# Patient Record
Sex: FEMALE | Race: WHITE | NOT HISPANIC OR LATINO | Employment: FULL TIME | URBAN - METROPOLITAN AREA
[De-identification: names, ages, dates, MRNs, and addresses within clinical notes are randomized per-mention and may not be internally consistent; named-entity substitution may affect disease eponyms.]

---

## 2017-01-01 ENCOUNTER — OFFICE VISIT (OUTPATIENT)
Dept: URGENT CARE | Facility: PHYSICIAN GROUP | Age: 27
End: 2017-01-01
Payer: COMMERCIAL

## 2017-01-01 VITALS
SYSTOLIC BLOOD PRESSURE: 110 MMHG | BODY MASS INDEX: 29.26 KG/M2 | HEART RATE: 90 BPM | OXYGEN SATURATION: 98 % | RESPIRATION RATE: 16 BRPM | WEIGHT: 160 LBS | DIASTOLIC BLOOD PRESSURE: 70 MMHG | TEMPERATURE: 96.6 F

## 2017-01-01 DIAGNOSIS — R10.12 LUQ PAIN: Primary | ICD-10-CM

## 2017-01-01 DIAGNOSIS — K21.9 GASTROESOPHAGEAL REFLUX DISEASE WITHOUT ESOPHAGITIS: ICD-10-CM

## 2017-01-01 DIAGNOSIS — R19.8 FULLNESS OF ABDOMEN: ICD-10-CM

## 2017-01-01 DIAGNOSIS — R10.9 BILATERAL FLANK PAIN: ICD-10-CM

## 2017-01-01 LAB
APPEARANCE UR: NORMAL
BILIRUB UR STRIP-MCNC: NORMAL MG/DL
COLOR UR AUTO: YELLOW
GLUCOSE UR STRIP.AUTO-MCNC: NORMAL MG/DL
KETONES UR STRIP.AUTO-MCNC: 40 MG/DL
LEUKOCYTE ESTERASE UR QL STRIP.AUTO: NORMAL
NITRITE UR QL STRIP.AUTO: NORMAL
PH UR STRIP.AUTO: 5 [PH] (ref 5–8)
PROT UR QL STRIP: NORMAL MG/DL
RBC UR QL AUTO: NORMAL
SP GR UR STRIP.AUTO: 1.03
UROBILINOGEN UR STRIP-MCNC: NORMAL MG/DL

## 2017-01-01 PROCEDURE — 81002 URINALYSIS NONAUTO W/O SCOPE: CPT | Performed by: PHYSICIAN ASSISTANT

## 2017-01-01 PROCEDURE — 99214 OFFICE O/P EST MOD 30 MIN: CPT | Performed by: PHYSICIAN ASSISTANT

## 2017-01-01 RX ORDER — OMEPRAZOLE 40 MG/1
40 CAPSULE, DELAYED RELEASE ORAL DAILY
Qty: 30 CAP | Refills: 0 | Status: SHIPPED | OUTPATIENT
Start: 2017-01-01 | End: 2019-11-18

## 2017-01-01 RX ORDER — DICYCLOMINE HYDROCHLORIDE 10 MG/1
10 CAPSULE ORAL
Qty: 40 CAP | Refills: 0 | Status: SHIPPED | OUTPATIENT
Start: 2017-01-01 | End: 2017-01-11

## 2017-01-01 ASSESSMENT — ENCOUNTER SYMPTOMS: ABDOMINAL PAIN: 1

## 2017-01-01 NOTE — PROGRESS NOTES
Subjective:      Pt is a 26 y.o. female who presents with Abdominal Pain            Abdominal Pain  This is a new problem. The current episode started in the past 7 days. The onset quality is sudden. The problem occurs constantly. The problem has been gradually worsening. The pain is located in the LUQ, right flank and left flank. The pain is at a severity of 6/10. The pain is moderate. The quality of the pain is aching, dull and a sensation of fullness. The abdominal pain does not radiate. The pain is aggravated by eating and palpation. The pain is relieved by nothing. She has tried nothing for the symptoms.   Pt states for the last 5 days she has had LUQ pain and abd sensation of fullness with B/L flank pain with no NVD. Pt notes suspected hx of kidney stones but does not have those kind of symptoms she states today. PT is out of network for insurance. Pt notes increased heartburn symptoms lately. Pt has not taken any Rx medications for this condition. Pt states the pain is a 6/10, aching in nature and worse at night. Pt denies CP, SOB, NVD, paresthesias, headaches, dizziness, change in vision, hives, or other joint pain. The pt's medication list, problem list, and allergies have been evaluated and reviewed during today's visit.    PMH:  Negative per pt.      PSH:  Negative per pt.      Fam Hx:  Father with hx of HTN      Soc HX:  Social History     Social History   • Marital Status: Unknown     Spouse Name: N/A   • Number of Children: N/A   • Years of Education: N/A     Occupational History   • Not on file.     Social History Main Topics   • Smoking status: Never Smoker    • Smokeless tobacco: Not on file   • Alcohol Use: Not on file   • Drug Use: Not on file   • Sexual Activity: Not on file     Other Topics Concern   • Not on file     Social History Narrative   • No narrative on file         Medications:    Current outpatient prescriptions:   •  dicyclomine (BENTYL) 10 MG Cap, Take 1 Cap by mouth 4 Times a  Day,Before Meals and at Bedtime for 10 days., Disp: 40 Cap, Rfl: 0  •  azithromycin (ZITHROMAX) 250 MG Tab, Take 2 tablets by mouth on day one then take 1 tablet daily for day 2-5, Disp: 6 Tab, Rfl: 0  •  albuterol (VENTOLIN OR PROVENTIL) 108 (90 BASE) MCG/ACT Aero Soln inhalation aerosol, Inhale 2 Puffs by mouth every 6 hours as needed for Shortness of Breath., Disp: 8.5 g, Rfl: 3      Allergies:  Pcn        Review of Systems   Gastrointestinal: Positive for abdominal pain.   Constitutional: Negative for fever, chills and malaise/fatigue.   HENT: Negative for congestion and sore throat.    Eyes: Negative for blurred vision, double vision and photophobia.   Respiratory: Negative for cough and shortness of breath.    Cardiovascular: Negative for chest pain and palpitations.   Gastrointestinal continued: POSive for recent heartburn, LUQ abd pain and B/L flank pain and NEG for nausea, vomiting, diarrhea.   Genitourinary: Negative for dysuria and flank pain.   Musculoskeletal: Negative for joint pain and myalgias.   Skin: Negative for itching and rash.   Neurological: Negative for dizziness, tingling and headaches.   Endo/Heme/Allergies: Does not bruise/bleed easily.   Psychiatric/Behavioral: Negative for depression. The patient is not nervous/anxious.             Objective:     /70 mmHg  Pulse 90  Temp(Src) 35.9 °C (96.6 °F)  Resp 16  Wt 72.576 kg (160 lb)  SpO2 98%     Physical Exam   Abdominal: Soft. Bowel sounds are normal. She exhibits no shifting dullness, no distension, no pulsatile liver, no fluid wave, no abdominal bruit, no ascites, no pulsatile midline mass and no mass. There is no hepatosplenomegaly. There is generalized tenderness and tenderness in the left upper quadrant. There is no rigidity, no rebound, no guarding, no CVA tenderness, no tenderness at McBurney's point and negative Palm's sign. No hernia.              Constitutional: PT is oriented to person, place, and time. PT appears  well-developed and well-nourished. No distress.   HENT:   Head: Normocephalic and atraumatic.   Mouth/Throat: Oropharynx is clear and moist. No oropharyngeal exudate.   Eyes: Conjunctivae normal and EOM are normal. Pupils are equal, round, and reactive to light.   Neck: Normal range of motion. Neck supple. No thyromegaly present.   Cardiovascular: Normal rate, regular rhythm, normal heart sounds and intact distal pulses.  Exam reveals no gallop and no friction rub.    No murmur heard.  Pulmonary/Chest: Effort normal and breath sounds normal. No respiratory distress. PT has no wheezes. PT has no rales. Pt exhibits no tenderness.   Musculoskeletal: Normal range of motion. PT exhibits no edema and no tenderness.   Neurological: PT is alert and oriented to person, place, and time. PT has normal reflexes. No cranial nerve deficit.   Skin: Skin is warm and dry. No rash noted. PT is not diaphoretic. No erythema.       Psychiatric: PT has a normal mood and affect. PT behavior is normal. Judgment and thought content normal.        Assessment/Plan:     1. LUQ pain    - POCT Urinalysis  - dicyclomine (BENTYL) 10 MG Cap; Take 1 Cap by mouth 4 Times a Day,Before Meals and at Bedtime for 10 days.  Dispense: 40 Cap; Refill: 0    2. Bilateral flank pain        3. Fullness of abdomen    4. GERD    - Prilosec called into pharmacy as well    Pt is out of network for insurance and CT abd/pelvis cannot be ordered today  Pt to follow up with [facility] if no improvement for possible CT abd/pelvis  Pt to follow up with PCP as needed  Rest, fluids encouraged.  AVS with medical info given.  Pt was in full understanding and agreement with the plan.  Follow-up as needed if symptoms worsen or fail to improve.

## 2017-01-01 NOTE — MR AVS SNAPSHOT
Amanda Wen   2017 9:50 AM   Office Visit   MRN: [id number]    Department:  [facility]   Dept Phone:  [phone]    Description:  Female : 1990   Provider:  Kobe Luong PA-C           Reason for Visit     Abdominal Pain LUQ discomfort x 5 days, bloating/lump on LUQ, lower back pain, unable to eat normal      Allergies as of 2017     Allergen Noted Reactions    Pcn [Penicillins] 2016       Father allergic as a child not sure if she is       You were diagnosed with     LUQ pain   [327224]  -  Primary     Bilateral flank pain   [265166]       Fullness of abdomen   [[id number]]         Vital Signs     Blood Pressure Pulse Temperature Respirations Weight Oxygen Saturation    110/70 mmHg 90 35.9 °C (96.6 °F) 16 72.576 kg (160 lb) 98%    Smoking Status                   Never Smoker            Basic Information     Date Of Birth Sex Race Ethnicity Preferred Language    1990 Female White Non- English      Health Maintenance        Date Due Completion Dates    IMM HEP B VACCINE (1 of 3 - Primary Series) 1989 ---    IMM HEP A VACCINE (1 of 2 - Standard Series) 1990 ---    IMM VARICELLA (CHICKENPOX) VACCINE (1 of 2 - 2 Dose Adolescent Series) 2002 ---    IMM DTaP/Tdap/Td Vaccine (1 - Tdap) 2008 ---    PAP SMEAR 2010 ---    IMM INFLUENZA (1) 2016 ---            Current Immunizations     No immunizations on file.      Below and/or attached are the medications your provider expects you to take. Review all of your home medications and newly ordered medications with your provider and/or pharmacist. Follow medication instructions as directed by your provider and/or pharmacist. Please keep your medication list with you and share with your provider. Update the information when medications are discontinued, doses are changed, or new medications (including over-the-counter products) are added; and carry medication information at all times in the event of  emergency situations     Allergies:  PCN - (reactions not documented)               Medications  Valid as of: January 01, 2017 - 10:08 AM    Generic Name Brand Name Tablet Size Instructions for use    Albuterol Sulfate (Aero Soln) albuterol 108 (90 BASE) MCG/ACT Inhale 2 Puffs by mouth every 6 hours as needed for Shortness of Breath.        Azithromycin (Tab) ZITHROMAX 250 MG Take 2 tablets by mouth on day one then take 1 tablet daily for day 2-5        Dicyclomine HCl (Cap) BENTYL 10 MG Take 1 Cap by mouth 4 Times a Day,Before Meals and at Bedtime for 10 days.        .                 Medicines prescribed today were sent to:     [facility] - [place], [place] - [address]    [address] [place] [address]    Phone: [phone] Fax: [phone]    Open 24 Hours?: No      Medication refill instructions:       If your prescription bottle indicates you have medication refills left, it is not necessary to call your provider’s office. Please contact your pharmacy and they will refill your medication.    If your prescription bottle indicates you do not have any refills left, you may request refills at any time through one of the following ways: The online [employer] system (except Urgent Care), by calling your provider’s office, or by asking your pharmacy to contact your provider’s office with a refill request. Medication refills are processed only during regular business hours and may not be available until the next business day. Your provider may request additional information or to have a follow-up visit with you prior to refilling your medication.   *Please Note: Medication refills are assigned a new Rx number when refilled electronically. Your pharmacy may indicate that no refills were authorized even though a new prescription for the same medication is available at the pharmacy. Please request the medicine by name with the pharmacy before contacting your provider for a refill.        Your To Do List     Future  Labs/Procedures Complete By Expires    CT-ABDOMEN-PELVIS WITH  As directed 1/1/2018      Instructions    Flank Pain  Flank pain refers to pain that is located on the side of the body between the upper abdomen and the back. The pain may occur over a short period of time (acute) or may be long-term or reoccurring (chronic). It may be mild or severe. Flank pain can be caused by many things.  CAUSES   Some of the more common causes of flank pain include:  · Muscle strains.    · Muscle spasms.    · A disease of your spine (vertebral disk disease).    · A lung infection (pneumonia).    · Fluid around your lungs (pulmonary edema).    · A kidney infection.    · Kidney stones.    · A very painful skin rash caused by the chickenpox virus (shingles).    · Gallbladder disease.    HOME CARE INSTRUCTIONS   Home care will depend on the cause of your pain. In general,  · Rest as directed by your caregiver.  · Drink enough fluids to keep your urine clear or pale yellow.  · Only take over-the-counter or prescription medicines as directed by your caregiver. Some medicines may help relieve the pain.  · Tell your caregiver about any changes in your pain.  · Follow up with your caregiver as directed.  SEEK IMMEDIATE MEDICAL CARE IF:   · Your pain is not controlled with medicine.    · You have new or worsening symptoms.  · Your pain increases.    · You have abdominal pain.    · You have shortness of breath.    · You have persistent nausea or vomiting.    · You have swelling in your abdomen.    · You feel faint or pass out.    · You have blood in your urine.  · You have a fever or persistent symptoms for more than 2-3 days.  · You have a fever and your symptoms suddenly get worse.  MAKE SURE YOU:   · Understand these instructions.  · Will watch your condition.  · Will get help right away if you are not doing well or get worse.     This information is not intended to replace advice given to you by your health care provider. Make sure you  discuss any questions you have with your health care provider.     Document Released: 02/08/2007 Document Revised: 09/11/2013 Document Reviewed: 08/01/2013  [email or web address] Interactive Patient Education ©2016 Elsevier Inc.            Soceaniq Access Code: [email or web address]  Expires: 1/31/2017 10:08 AM    [employer]  A secure, online tool to manage your health information     [employer] Soceaniq® is a secure, online tool that connects you to your personalized health information from the privacy of your home -- day or night - making it very easy for you to manage your healthcare. Once the activation process is completed, you can even access your medical information using the Soceaniq ayo, which is available for free in the Apple Ayo store or Google Play store.     Soceaniq provides the following levels of access (as shown below):   My Chart Features   [facility]  Urgent  Care [facility]  Primary Care  Doctor   Email your healthcare team securely and privately 24/7 X X X    Manage appointments: schedule your next appointment; view details of past/upcoming appointments X      Request prescription refills. X      View recent personal medical records, including lab and immunizations X X X X   View health record, including health history, allergies, medications X X X X   Read reports about your outpatient visits, procedures, consult and ER notes X X X X   See your discharge summary, which is a recap of your hospital and/or ER visit that includes your diagnosis, lab results, and care plan. X X       How to register for Soceaniq:  1. Go to  [email or web address].  2. Click on the Sign Up Now box, which takes you to the New Member Sign Up page. You will need to provide the following information:  a. Enter your Soceaniq Access Code exactly as it appears at the top of this page. (You will not need to use this code after you’ve completed the sign-up process. If you do not sign up before the  expiration date, you must request a new code.)   b. Enter your date of birth.   c. Enter your home email address.   d. Click Submit, and follow the next screen’s instructions.  3. Create a [employer] ID. This will be your [employer] login ID and cannot be changed, so think of one that is secure and easy to remember.  4. Create a [employer] password. You can change your password at any time.  5. Enter your Password Reset Question and Answer. This can be used at a later time if you forget your password.   6. Enter your e-mail address. This allows you to receive e-mail notifications when new information is available in [employer].  7. Click Sign Up. You can now view your health information.    For assistance activating your [employer] account, call [phone]

## 2017-01-01 NOTE — PATIENT INSTRUCTIONS
Flank Pain  Flank pain refers to pain that is located on the side of the body between the upper abdomen and the back. The pain may occur over a short period of time (acute) or may be long-term or reoccurring (chronic). It may be mild or severe. Flank pain can be caused by many things.  CAUSES   Some of the more common causes of flank pain include:  · Muscle strains.    · Muscle spasms.    · A disease of your spine (vertebral disk disease).    · A lung infection (pneumonia).    · Fluid around your lungs (pulmonary edema).    · A kidney infection.    · Kidney stones.    · A very painful skin rash caused by the chickenpox virus (shingles).    · Gallbladder disease.    HOME CARE INSTRUCTIONS   Home care will depend on the cause of your pain. In general,  · Rest as directed by your caregiver.  · Drink enough fluids to keep your urine clear or pale yellow.  · Only take over-the-counter or prescription medicines as directed by your caregiver. Some medicines may help relieve the pain.  · Tell your caregiver about any changes in your pain.  · Follow up with your caregiver as directed.  SEEK IMMEDIATE MEDICAL CARE IF:   · Your pain is not controlled with medicine.    · You have new or worsening symptoms.  · Your pain increases.    · You have abdominal pain.    · You have shortness of breath.    · You have persistent nausea or vomiting.    · You have swelling in your abdomen.    · You feel faint or pass out.    · You have blood in your urine.  · You have a fever or persistent symptoms for more than 2-3 days.  · You have a fever and your symptoms suddenly get worse.  MAKE SURE YOU:   · Understand these instructions.  · Will watch your condition.  · Will get help right away if you are not doing well or get worse.     This information is not intended to replace advice given to you by your health care provider. Make sure you discuss any questions you have with your health care provider.     Document Released: 02/08/2007 Document  Revised: 09/11/2013 Document Reviewed: 08/01/2013  Elsevier Interactive Patient Education ©2016 Elsevier Inc.

## 2019-09-10 ENCOUNTER — TELEPHONE (OUTPATIENT)
Dept: URGENT CARE | Facility: PHYSICIAN GROUP | Age: 29
End: 2019-09-10

## 2019-09-10 ENCOUNTER — OFFICE VISIT (OUTPATIENT)
Dept: URGENT CARE | Facility: PHYSICIAN GROUP | Age: 29
End: 2019-09-10
Payer: COMMERCIAL

## 2019-09-10 VITALS
HEIGHT: 63 IN | SYSTOLIC BLOOD PRESSURE: 102 MMHG | HEART RATE: 75 BPM | WEIGHT: 121 LBS | DIASTOLIC BLOOD PRESSURE: 70 MMHG | OXYGEN SATURATION: 100 % | BODY MASS INDEX: 21.44 KG/M2 | TEMPERATURE: 98 F

## 2019-09-10 DIAGNOSIS — M54.12 CERVICAL RADICULOPATHY: ICD-10-CM

## 2019-09-10 DIAGNOSIS — R93.89 ABNORMAL MRI: ICD-10-CM

## 2019-09-10 DIAGNOSIS — M54.2 NECK PAIN: ICD-10-CM

## 2019-09-10 DIAGNOSIS — Z86.018 HISTORY OF BENIGN SPINAL CORD TUMOR: ICD-10-CM

## 2019-09-10 PROCEDURE — 99214 OFFICE O/P EST MOD 30 MIN: CPT | Performed by: FAMILY MEDICINE

## 2019-09-10 RX ORDER — GABAPENTIN 300 MG/1
300 CAPSULE ORAL 3 TIMES DAILY
Qty: 90 CAP | Refills: 1 | Status: SHIPPED | OUTPATIENT
Start: 2019-09-10 | End: 2019-11-18

## 2019-09-10 ASSESSMENT — ENCOUNTER SYMPTOMS: WEIGHT LOSS: 0

## 2019-09-10 ASSESSMENT — LIFESTYLE VARIABLES: SUBSTANCE_ABUSE: 0

## 2019-09-10 NOTE — PROGRESS NOTES
"Subjective:      Amanda Wen is a 29 y.o. female who presents with Stiff Neck (L shoulder pain , numbness, head pain, xwks )            1.5 months initial left neck pain that was worse with head rotation. Possibly triggered by working out. She stopped working out and now has intermittent HA and radicular pain. 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 No fever. No trauma. No PMH/FH MS or autoimmune. No other aggravating or alleviating factors.        Review of Systems   Constitutional: Negative for malaise/fatigue and weight loss.   Skin: Negative for itching and rash.   Psychiatric/Behavioral: Negative for substance abuse.   .  Medications, Allergies, and current problem list reviewed today in Epic       Objective:     /70   Pulse 75   Temp 36.7 °C (98 °F) (Temporal)   Ht 1.6 m (5' 3\")   Wt 54.9 kg (121 lb)   LMP 08/15/2019   SpO2 100%   BMI 21.43 kg/m²      Physical Exam   Constitutional: She is oriented to person, place, and time. She appears well-developed and well-nourished. No distress.   HENT:   Head: Normocephalic and atraumatic.   Eyes: Conjunctivae are normal.   Neck: Neck supple.   Left paracervical tendeness. No axial load tenderness. ROM intact. No deformity or stepoff.    Cardiovascular: Normal rate, regular rhythm and normal heart sounds.   Pulmonary/Chest: Effort normal and breath sounds normal.   Neurological: She is alert and oriented to person, place, and time. No cranial nerve deficit.   Normal gait   =  No focal deficit               Assessment/Plan:   MRI per radiology    1.  Postsurgical changes C1-T1 laminectomies.  2.  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 This could be posttreatment related however comparison with old outside prior examinations is necessary to evaluate for stability.   If prior studies are made available, comparison can BE performed and an " addendum may be placed. Further evaluation with postcontrast images would also be helpful.  3.  C3-C7 disc degeneration as detailed above with right foraminal stenosis.  4.  No significant spinal stenosis.        1. Neck pain  MR-CERVICAL SPINE-W/O   2. 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 Abnormal MRI  REFERRAL TO NEUROSURGERY     Differential diagnosis, natural history, supportive care, and indications for immediate follow-up discussed at length.

## 2019-09-10 NOTE — LETTER
September 10, 2019         Patient: Amanda Wen   YOB: 1990   Date of Visit: 9/10/2019           To Whom it May Concern:    Amanda Wen was seen in my clinic on 9/10/2019. Please excuse today.      Sincerely,           Facundo Ramirez M.D.  Electronically Signed

## 2019-09-13 ENCOUNTER — TELEPHONE (OUTPATIENT)
Dept: SCHEDULING | Facility: IMAGING CENTER | Age: 29
End: 2019-09-13

## 2019-09-20 ENCOUNTER — HOSPITAL ENCOUNTER (OUTPATIENT)
Dept: RADIOLOGY | Facility: MEDICAL CENTER | Age: 29
End: 2019-09-20
Attending: FAMILY MEDICINE
Payer: COMMERCIAL

## 2019-09-20 DIAGNOSIS — M54.12 CERVICAL RADICULOPATHY: ICD-10-CM

## 2019-09-20 DIAGNOSIS — Z86.018 HISTORY OF BENIGN SPINAL CORD TUMOR: ICD-10-CM

## 2019-09-20 DIAGNOSIS — M54.2 NECK PAIN: ICD-10-CM

## 2019-09-20 PROCEDURE — 72141 MRI NECK SPINE W/O DYE: CPT

## 2019-10-18 ENCOUNTER — OFFICE VISIT (OUTPATIENT)
Dept: MEDICAL GROUP | Facility: PHYSICIAN GROUP | Age: 29
End: 2019-10-18
Payer: COMMERCIAL

## 2019-10-18 VITALS
RESPIRATION RATE: 16 BRPM | SYSTOLIC BLOOD PRESSURE: 110 MMHG | OXYGEN SATURATION: 97 % | WEIGHT: 122.8 LBS | BODY MASS INDEX: 21.76 KG/M2 | DIASTOLIC BLOOD PRESSURE: 70 MMHG | HEIGHT: 63 IN | HEART RATE: 64 BPM | TEMPERATURE: 98 F

## 2019-10-18 DIAGNOSIS — F33.9 EPISODE OF RECURRENT MAJOR DEPRESSIVE DISORDER, UNSPECIFIED DEPRESSION EPISODE SEVERITY (HCC): ICD-10-CM

## 2019-10-18 DIAGNOSIS — G95.0 SYRINGOHYDROMYELIA (HCC): ICD-10-CM

## 2019-10-18 DIAGNOSIS — L03.032 PARONYCHIA OF TOE OF LEFT FOOT: ICD-10-CM

## 2019-10-18 DIAGNOSIS — Z23 NEED FOR VACCINATION: ICD-10-CM

## 2019-10-18 PROBLEM — L03.039 PARONYCHIA, TOE: Status: ACTIVE | Noted: 2019-10-18

## 2019-10-18 PROBLEM — L03.012 PARONYCHIA OF LEFT THUMB: Status: ACTIVE | Noted: 2019-10-18

## 2019-10-18 PROBLEM — F32.A DEPRESSION: Status: ACTIVE | Noted: 2019-10-18

## 2019-10-18 PROCEDURE — 90715 TDAP VACCINE 7 YRS/> IM: CPT | Performed by: FAMILY MEDICINE

## 2019-10-18 PROCEDURE — 99214 OFFICE O/P EST MOD 30 MIN: CPT | Mod: 25 | Performed by: FAMILY MEDICINE

## 2019-10-18 PROCEDURE — 90686 IIV4 VACC NO PRSV 0.5 ML IM: CPT | Performed by: FAMILY MEDICINE

## 2019-10-18 PROCEDURE — 90471 IMMUNIZATION ADMIN: CPT | Performed by: FAMILY MEDICINE

## 2019-10-18 PROCEDURE — 90472 IMMUNIZATION ADMIN EACH ADD: CPT | Performed by: FAMILY MEDICINE

## 2019-10-18 RX ORDER — DULOXETIN HYDROCHLORIDE 20 MG/1
40 CAPSULE, DELAYED RELEASE ORAL DAILY
Qty: 30 CAP | Refills: 0 | Status: SHIPPED | OUTPATIENT
Start: 2019-10-18 | End: 2019-11-13 | Stop reason: SDUPTHER

## 2019-10-18 RX ORDER — SULFAMETHOXAZOLE AND TRIMETHOPRIM 800; 160 MG/1; MG/1
1 TABLET ORAL 2 TIMES DAILY
Qty: 14 TAB | Refills: 0 | Status: SHIPPED | OUTPATIENT
Start: 2019-10-18 | End: 2019-11-18

## 2019-10-18 ASSESSMENT — PATIENT HEALTH QUESTIONNAIRE - PHQ9: CLINICAL INTERPRETATION OF PHQ2 SCORE: 0

## 2019-10-18 NOTE — ASSESSMENT & PLAN NOTE
This is a chronic problem. 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 After this surgery she has developed a cyst. When she was 7 years old she had laminectomy to relieve cyst pressure. She went to urgent care last month as she was experiencing some neck pain which she initially attributed this to her syringohydromyelia. At the  she had and MRI of her neck which showed postoperative changes and she was referred to neurosurgery. She followed up with neurosurgery approximately 1 week ago and was told that there is nothing to be done at this time in terms of her neuropathic pain from the surgical perspective.She realizes that her neck pain is rather new and now wonders if it's related to depression.  Gabapentin has helped with her pain.

## 2019-10-18 NOTE — ASSESSMENT & PLAN NOTE
This is a chronic problem.   Symptom onset: Diagnosed with depression at age 17.   Current symptoms: Currently, she decreased interest in doing things and feels little happiness. Has tried gabapentin which does help with nerve pain but feels like this is making her condition worse. Denies any suicidality/homicidality.   Since onset symptoms are: getting worse   Treatments tried: Used to be on antidepressants for 6 months which did help. Currently not taking any antidepressants. Has not seen a therapist  for many years.   Associated symptoms: At times she also experiences muscular/nerve pain - localized to her neck and arms, legs.

## 2019-10-18 NOTE — PROGRESS NOTES
Subjective:     Chief Complaint   Patient presents with   • Establish Care     swollen toe 3+ days ago    • Medication Management     gabapentin side effects    • Other     Immunizations        HPI:   Amanda presents today to discuss the following.  Prior PCP: None.    Depression  This is a chronic problem.   Symptom onset: Diagnosed with depression at age 17.   Current symptoms: Currently, she decreased interest in doing things and feels little happiness. Has tried gabapentin which does help with nerve pain but feels like this is making her condition worse. Denies any suicidality/homicidality.   Since onset symptoms are: getting worse   Treatments tried: Used to be on antidepressants for 6 months which did help. Currently not taking any antidepressants. Has not seen a therapist  for many years.   Associated symptoms: At times she also experiences muscular/nerve pain - localized to her neck and arms, legs.       Syringohydromyelia (HCC)  This is a chronic problem. 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 After this surgery she has developed a cyst. When she was 7 years old she had laminectomy to relieve cyst pressure. She went to urgent care last month as she was experiencing some neck pain which she initially attributed this to her syringohydromyelia. At the  she had and MRI of her neck which showed postoperative changes and she was referred to neurosurgery. She followed up with neurosurgery approximately 1 week ago and was told that there is nothing to be done at this time in terms of her neuropathic pain from the surgical perspective.She realizes that her neck pain is rather new and now wonders if it's related to depression.  Gabapentin has helped with her pain.     Paronychia, toe  This is a new problem. 2 days ago she noticed inflammation to her medial nail fold of her left 1st toe and earlier today she noticed some pus coming out. It is mildly tender and pain exacerbates by walking. Denies  this being a recurrent problem. Denies any systemic symptoms including fever or chills. At home she hasn't tried anything for relief. Overall, she feels like it's getting worse.       Past Medical History:   Diagnosis Date   • Depression    • Syringohydromyelia (HCC)        Social History     Tobacco Use   • Smoking status: Never Smoker   • Smokeless tobacco: Never Used   Substance Use Topics   • Alcohol use: Not Currently   • Drug use: Yes     Frequency: 7.0 times per week     Types: Marijuana, Inhaled       Current Outpatient Medications Ordered in Epic   Medication Sig Dispense Refill   • sulfamethoxazole-trimethoprim (BACTRIM DS) 800-160 MG tablet Take 1 Tab by mouth 2 times a day. 14 Tab 0   • DULoxetine (CYMBALTA) 20 MG Cap DR Particles Take 2 Caps by mouth every day. 30 Cap 0   • gabapentin (NEURONTIN) 300 MG Cap Take 1 Cap by mouth 3 times a day. 90 Cap 1   • omeprazole (PRILOSEC) 40 MG delayed-release capsule Take 1 Cap by mouth every day. (Patient not taking: Reported on 9/10/2019) 30 Cap 0   • azithromycin (ZITHROMAX) 250 MG Tab Take 2 tablets by mouth on day one then take 1 tablet daily for day 2-5 (Patient not taking: Reported on 9/10/2019) 6 Tab 0   • albuterol (VENTOLIN OR PROVENTIL) 108 (90 BASE) MCG/ACT Aero Soln inhalation aerosol Inhale 2 Puffs by mouth every 6 hours as needed for Shortness of Breath. (Patient not taking: Reported on 9/10/2019) 8.5 g 3     No current [facility]-ordered facility-administered medications on file.        Allergies:  Pcn [penicillins]    Health Maintenance: Completed    ROS:  Gen: no fevers/chills  Eyes: no changes in vision  ENT: no sore throat, no hearing loss, no bloody nose  Pulm: no sob, no cough  CV: no chest pain  GI: no nausea/vomiting, no diarrhea  : no dysuria  MSk: Endorses posterior neck pain, bilateral thigh pain.  Skin: no rash  Neuro: no headaches      Objective:     Exam:  /70 (BP Location: Left arm, Patient Position: Sitting, BP Cuff Size: Adult)    "Pulse 64   Temp 36.7 °C (98 °F) (Temporal)   Resp 16   Ht 1.6 m (5' 3\")   Wt 55.7 kg (122 lb 12.8 oz)   SpO2 97%   BMI 21.75 kg/m²  Body mass index is 21.75 kg/m².    Gen: Alert and oriented, No apparent distress.  HENT: TMs are clear. Oropharynx is w/o erythema or exudates. Neck is supple without cervical lymphadenopathy. No JVD.   Eyes: PERRLA  Lungs: Normal effort, CTA bilaterally, no wheezes, rhonchi, or rales  CV: Regular rate and rhythm. No murmurs, rubs, or gallops.  Abdomen: Soft, nontender, nondistended. Normal bowel sounds. Liver and spleen are not palpable  Ext: No clubbing, cyanosis, edema.  Inspection of her left foot shows evidence of paronychia localized to her medial first toe nail fold.  There is no evidence of current purulent drainage.  It is mildly erythematous and tender to touch.  Skin: no rash, lesions or ulcers  Neuro: Moves all extremities.  Psych: AAOx3      Assessment & Plan:     29 y.o. female with the following -     1. Syringohydromyelia (HCC)  This is a chronic condition, stable.  The patient endorses having a long history of this condition and is status post 2 surgeries as early as 13 months of age.  She has followed up with neurosurgery approximately 1 week ago and from the surgical standpoint there is not anything else to do.  At times she feels a cervical radiculopathy that could be related to her condition but this could also be secondary to a manifestation of her depression see problem #2 (I.e. - fibromyalgia).   -Continue to follow-up with neurosurgery as needed    2. Episode of recurrent major depressive disorder, unspecified depression episode severity (HCC)  This is a chronic condition, worsening.  She has a long-standing history of depression since she was 17 years of age.  She has been on SSRI treatment on and off.  Today she mentions that her depression is not well controlled and also mentions having muscular pain which is some how different than her baseline " throughout her life.  This raises my suspicion that her pain might be secondary to depression.  She is amenable to being referred to behavioral health and to start pharmacotherapy.  I believe that she is a great candidate for Cymbalta given that it could target her depression and also neuropathic/muscular pain.  I will start at 40 mg and slowly titrate up to 60 mg.  I recommend that she discontinues gabapentin.  I have placed a referral to behavioral health as well.  She denies any suicidality or homicidality today.  ED precautions given.  - DULoxetine (CYMBALTA) 20 MG Cap DR Particles; Take 2 Caps by mouth every day.  Dispense: 30 Cap; Refill: 0  - REFERRAL TO BEHAVIORAL HEALTH    3. Paronychia of toe of left foot  This is a new problem.  She presents endorsing a 2-day history of development of paronychia to her left big toe.  She mentions that the condition is getting worse and it is painful when she walks and earlier today she noticed some purulent drainage.  The likely pathogen is staph aureus with unknown MRSA history.  She denies any systemic signs of infection including fever or chills.  Given that her condition is getting worse and the fact that she is having purulent drainage I will treat with oral antibiotic.  She is allergic to penicillins and thus I will opt for Bactrim for 7 days.  I recommend that she finishes the whole 7-day course even if she notices improvement before then.  The patient verbalized understanding.  - sulfamethoxazole-trimethoprim (BACTRIM DS) 800-160 MG tablet; Take 1 Tab by mouth 2 times a day.  Dispense: 14 Tab; Refill: 0    4. Need for vaccination  She reports having a history of being unimmunized since she was 7 years old.  At this time she is only eligible for the flu and Tdap.  - Influenza Vaccine Quad Injection (PF)  - Tdap Vaccine =>8YO IM      Return in about 4 weeks (around 11/15/2019) for Fu on depression .    Please note that this dictation was created using voice  recognition software. I have made every reasonable attempt to correct obvious errors, but I expect that there are errors of grammar and possibly content that I did not discover before finalizing the note.

## 2019-10-18 NOTE — ASSESSMENT & PLAN NOTE
This is a new problem. 2 days ago she noticed inflammation to her medial nail fold of her left 1st toe and earlier today she noticed some pus coming out. It is mildly tender and pain exacerbates by walking. Denies this being a recurrent problem. Denies any systemic symptoms including fever or chills. At home she hasn't tried anything for relief. Overall, she feels like it's getting worse.

## 2019-11-18 ENCOUNTER — OFFICE VISIT (OUTPATIENT)
Dept: MEDICAL GROUP | Facility: PHYSICIAN GROUP | Age: 29
End: 2019-11-18
Payer: COMMERCIAL

## 2019-11-18 VITALS
WEIGHT: 119.8 LBS | HEART RATE: 76 BPM | DIASTOLIC BLOOD PRESSURE: 62 MMHG | SYSTOLIC BLOOD PRESSURE: 104 MMHG | BODY MASS INDEX: 21.23 KG/M2 | OXYGEN SATURATION: 97 % | TEMPERATURE: 98 F | HEIGHT: 63 IN | RESPIRATION RATE: 16 BRPM

## 2019-11-18 DIAGNOSIS — G95.0 SYRINGOHYDROMYELIA (HCC): ICD-10-CM

## 2019-11-18 DIAGNOSIS — F33.9 EPISODE OF RECURRENT MAJOR DEPRESSIVE DISORDER, UNSPECIFIED DEPRESSION EPISODE SEVERITY (HCC): ICD-10-CM

## 2019-11-18 PROBLEM — L03.039 PARONYCHIA, TOE: Status: RESOLVED | Noted: 2019-10-18 | Resolved: 2019-11-18

## 2019-11-18 PROCEDURE — 99213 OFFICE O/P EST LOW 20 MIN: CPT | Performed by: FAMILY MEDICINE

## 2019-11-18 RX ORDER — DULOXETIN HYDROCHLORIDE 30 MG/1
30 CAPSULE, DELAYED RELEASE ORAL 2 TIMES DAILY
Qty: 60 CAP | Refills: 3 | Status: SHIPPED | OUTPATIENT
Start: 2019-11-18 | End: 2020-03-07 | Stop reason: SDUPTHER

## 2019-11-18 ASSESSMENT — PATIENT HEALTH QUESTIONNAIRE - PHQ9: CLINICAL INTERPRETATION OF PHQ2 SCORE: 0

## 2019-11-18 NOTE — ASSESSMENT & PLAN NOTE
There is a chronic problem.  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  After the surgery she developed a cyst.  At 7 years old she had a laminectomy to relieve cyst pressure.  Subsequently she has complained of posterior neck pain.  This is getting relieved with duloxetine.  She has seen neurosurgery for this problem and they mentioned that there is not anything else to do at this time from the surgical standpoint.

## 2019-11-18 NOTE — ASSESSMENT & PLAN NOTE
This is a chronic problem.   Symptom onset: Diagnosed with depression at age 17.   Current symptoms: Currently, she has reported decreased interest in doing things and feels little happiness. Has tried gabapentin which does help with nerve pain but feels like this is making her condition worse. Denies any suicidality/homicidality. I started a trial of Cymbalta 20mg BID. She reports great improvement in her depression and pain.   Since onset symptoms are: getting worse   Treatments tried: She is currently taking Cymbalta 20mg BID and tolerating well. Still waiting on behavioral health.   Associated symptoms: Muscular/nerve pain - localized to her neck and arms, legs is mostly gone now. This is bearable.

## 2019-11-18 NOTE — PROGRESS NOTES
Subjective:     Chief Complaint   Patient presents with   • Depression     medication helping        HPI:   Amanda presents today to discuss the following.    Depression  This is a chronic problem.   Symptom onset: Diagnosed with depression at age 17.   Current symptoms: Currently, she has reported decreased interest in doing things and feels little happiness. Has tried gabapentin which does help with nerve pain but feels like this is making her condition worse. Denies any suicidality/homicidality. I started a trial of Cymbalta 20mg BID. She reports great improvement in her depression and pain.   Since onset symptoms are: getting worse   Treatments tried: She is currently taking Cymbalta 20mg BID and tolerating well. Still waiting on behavioral health.   Associated symptoms: Muscular/nerve pain - localized to her neck and arms, legs is mostly gone now. This is bearable.       Syringohydromyelia (HCC)  There is a chronic problem.  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  After the surgery she developed a cyst.  At 7 years old she had a laminectomy to relieve cyst pressure.  Subsequently she has complained of posterior neck pain.  This is getting relieved with duloxetine.  She has seen neurosurgery for this problem and they mentioned that there is not anything else to do at this time from the surgical standpoint.      Past Medical History:   Diagnosis Date   • Depression    • Syringohydromyelia (HCC)        Social History     Tobacco Use   • Smoking status: Never Smoker   • Smokeless tobacco: Never Used   Substance Use Topics   • Alcohol use: Not Currently   • Drug use: Yes     Frequency: 7.0 times per week     Types: Marijuana, Inhaled       Current Outpatient Medications Ordered in Epic   Medication Sig Dispense Refill   • DULoxetine (CYMBALTA) 30 MG Cap DR Particles Take 1 Cap by mouth 2 times a day. 60 Cap 3     No current Epic-ordered facility-administered  "medications on file.        Allergies:  Pcn [penicillins]    Health Maintenance: Completed    ROS:  Gen: no fevers/chills  Eyes: no changes in vision  ENT: no sore throat, no hearing loss, no bloody nose  Pulm: no sob, no cough  CV: no chest pain, no palpitations  GI: no nausea/vomiting, no diarrhea  MSk: no myalgias  Skin: no rash    Objective:       Exam:  /62 (BP Location: Left arm, Patient Position: Sitting, BP Cuff Size: Adult)   Pulse 76   Temp 36.7 °C (98 °F) (Temporal)   Resp 16   Ht 1.6 m (5' 3\")   Wt 54.3 kg (119 lb 12.8 oz)   SpO2 97%   BMI 21.22 kg/m²  Body mass index is 21.22 kg/m².    Gen: Alert and oriented, No apparent distress.  HENT: TMs are clear. Oropharynx is w/o erythema or exudates. Neck is supple without cervical lymphadenopathy. No JVD.   Eyes: PERRLA  Lungs: Normal effort, CTA bilaterally, no wheezes, rhonchi, or rales  CV: Regular rate and rhythm. No murmurs, rubs, or gallops.  Abdomen: Soft, nontender, nondistended. Normal bowel sounds. Liver and spleen are not palpable  Ext: No clubbing, cyanosis, edema.  Skin: no rash, lesions or ulcers  Neuro: Moves all extremities.  Psych: AAOx3      Assessment & Plan:     29 y.o. female with the following -     1. Episode of recurrent major depressive disorder, unspecified depression episode severity (HCC)  This is a chronic condition, stable.  The patient has a long-standing history of depression now well controlled with Cymbalta 20 mg twice daily.  It appears that she also has had an element of fibromyalgia given that she was having generalized pain mainly localized to her neck, arms, legs.  This is now resolved with Cymbalta.  She denies having any major side effects to this medication.  She feels ready to go up on the dose.  We will go up to 30 mg twice daily to add up to 60 mg daily.  She still would like to connect with behavioral health.  Looks like referral has gone through and I provided her with their contact information.  She " denies any suicidality/homicidality.  - DULoxetine (CYMBALTA) 30 MG Cap DR Particles; Take 1 Cap by mouth 2 times a day.  Dispense: 60 Cap; Refill: 3    2. Syringohydromyelia (HCC)  There is a chronic condition, stable.  The patient has experienced some remnant posterior neck pain from her previous surgeries.  It appears that it is getting better with duloxetine.  From neurosurgery standpoint there is not anything else to do.  At this time I will go up on duloxetine to 30 mg twice daily.  If pain persists I will consider physical therapy.  Patient is agreeable to plan.  - DULoxetine (CYMBALTA) 30 MG Cap DR Particles; Take 1 Cap by mouth 2 times a day.  Dispense: 60 Cap; Refill: 3      Return in about 3 months (around 2/18/2020) for FU on depression .    Please note that this dictation was created using voice recognition software. I have made every reasonable attempt to correct obvious errors, but I expect that there are errors of grammar and possibly content that I did not discover before finalizing the note.

## 2019-11-19 ENCOUNTER — TELEPHONE (OUTPATIENT)
Dept: MEDICAL GROUP | Facility: PHYSICIAN GROUP | Age: 29
End: 2019-11-19

## 2019-11-19 NOTE — TELEPHONE ENCOUNTER
DOCUMENTATION OF PAR STATUS:    1. Name of Medication & Dose: duloxetine 30 mg cap BID     2. Name of Prescription Coverage Company & phone #: [employer]       3. Date Prior Auth Submitted: 11/19/19     4. What information was given to obtain insurance decision? Last 2 progress notes with PCP.     5. Prior Auth Status? Pending    6. Patient Notified: no    PA completed via covermymeds & scanned to media.

## 2019-12-12 ENCOUNTER — OFFICE VISIT (OUTPATIENT)
Dept: MEDICAL GROUP | Facility: PHYSICIAN GROUP | Age: 29
End: 2019-12-12
Payer: COMMERCIAL

## 2019-12-12 VITALS
HEIGHT: 63 IN | TEMPERATURE: 97.4 F | HEART RATE: 78 BPM | RESPIRATION RATE: 16 BRPM | OXYGEN SATURATION: 95 % | DIASTOLIC BLOOD PRESSURE: 70 MMHG | SYSTOLIC BLOOD PRESSURE: 108 MMHG | BODY MASS INDEX: 21.72 KG/M2 | WEIGHT: 122.6 LBS

## 2019-12-12 DIAGNOSIS — G47.33 OSA (OBSTRUCTIVE SLEEP APNEA): ICD-10-CM

## 2019-12-12 DIAGNOSIS — R06.83 SNORING: ICD-10-CM

## 2019-12-12 PROCEDURE — 99214 OFFICE O/P EST MOD 30 MIN: CPT | Performed by: FAMILY MEDICINE

## 2019-12-13 NOTE — PATIENT INSTRUCTIONS

## 2019-12-13 NOTE — PROGRESS NOTES
"Subjective:     Chief Complaint   Patient presents with   • Sleep Problem     coughing, gasping 9-10 times a night        HPI:   Amanda presents today to discuss the following.    Snoring  This is a chronic condition.   Symptom onset: The patient has been c/o experiencing snoring for 6 months. She has been experiencing excessive fatigue during day. Also, she has been waking up to 10 times per night with chocking and gasping for air.   Current symptoms: Endorses fatigue   Modifying factors: She doesn't cosleep but downstairs neighbor complained that her snoring is bad.   Since onset symptoms are: Worsening   Treatments tried: Has not done anything for treatment yet.   Associated symptoms: Endorses fatigue. No other associated symptoms. Denies trouble breathing during the day.         Past Medical History:   Diagnosis Date   • Depression    • Syringohydromyelia (HCC)        Social History     Tobacco Use   • Smoking status: Never Smoker   • Smokeless tobacco: Never Used   Substance Use Topics   • Alcohol use: Not Currently   • Drug use: Yes     Frequency: 7.0 times per week     Types: Marijuana, Inhaled       Current Outpatient Medications Ordered in Epic   Medication Sig Dispense Refill   • DULoxetine (CYMBALTA) 30 MG Cap DR Particles Take 1 Cap by mouth 2 times a day. 60 Cap 3     No current Epic-ordered facility-administered medications on file.        Allergies:  Pcn [penicillins]    Health Maintenance: Completed    ROS:  Gen: no fevers/chills  Eyes: no changes in vision  ENT: no sore throat, no hearing loss, no bloody nose  Pulm: no sob, no cough  CV: no chest pain, no palpitations  GI: no nausea/vomiting, no diarrhea      Objective:       Exam:  /70 (BP Location: Left arm, Patient Position: Sitting, BP Cuff Size: Adult)   Pulse 78   Temp 36.3 °C (97.4 °F) (Temporal)   Resp 16   Ht 1.6 m (5' 3\")   Wt 55.6 kg (122 lb 9.6 oz)   SpO2 95%   BMI 21.72 kg/m²  Body mass index is 21.72 kg/m².    Gen: Alert " and oriented, No apparent distress.  HENT: TMs are clear. Oropharynx is w/o erythema or exudates.  No tonsillar hypertrophy.  Neck is supple without cervical lymphadenopathy. No JVD.   Eyes: PERRLA  Lungs: Normal effort, CTA bilaterally, no wheezes, rhonchi, or rales  CV: Regular rate and rhythm. No murmurs, rubs, or gallops.  Abdomen: Soft, nontender, nondistended. Normal bowel sounds. Liver and spleen are not palpable  Ext: No clubbing, cyanosis, edema.  Skin: no rash, lesions or ulcers  Neuro: Moves all extremities.  Psych: AAOx3    Assessment & Plan:     29 y.o. female with the following -     1. Snoring  2. SAUMYA (obstructive sleep apnea)  This is a new problem.  The patient presents today reporting for the first time having a 6-month history of snoring that has gotten progressively worse.  She knows this as her downstairs neighbor has complained of her snoring at night.  She also endorses symptomatology consistent with SAUMYA including excessive fatigue during the day.  She mentions that recently she has also woken up, up to 10 times a night gasping for air.  On exam today I do not appreciate any tonsillar hypertrophy as a potential cause of obstruction at night.  She mentions that in the past she was evaluated by an ENT physician for this but was given reassurance.  She has never been evaluated for obstructive sleep apnea.  At this time I would like to refer her to a sleep study for further evaluation. Return precautions given today.  - REFERRAL TO SLEEP STUDIES  - Await sleep study results      Return if symptoms worsen or fail to improve.    Please note that this dictation was created using voice recognition software. I have made every reasonable attempt to correct obvious errors, but I expect that there are errors of grammar and possibly content that I did not discover before finalizing the note.

## 2019-12-13 NOTE — ASSESSMENT & PLAN NOTE
This is a chronic condition.   Symptom onset: The patient has been c/o experiencing snoring for 6 months. She has been experiencing excessive fatigue during day. Also, she has been waking up to 10 times per night with chocking and gasping for air.   Current symptoms: Endorses fatigue   Modifying factors: She doesn't cosleep but downstairs neighbor complained that her snoring is bad.   Since onset symptoms are: Worsening   Treatments tried: Has not done anything for treatment yet.   Associated symptoms: Endorses fatigue. No other associated symptoms. Denies trouble breathing during the day.

## 2020-02-18 ENCOUNTER — OFFICE VISIT (OUTPATIENT)
Dept: MEDICAL GROUP | Facility: PHYSICIAN GROUP | Age: 30
End: 2020-02-18
Payer: COMMERCIAL

## 2020-02-18 VITALS
RESPIRATION RATE: 16 BRPM | HEART RATE: 68 BPM | BODY MASS INDEX: 21.23 KG/M2 | DIASTOLIC BLOOD PRESSURE: 60 MMHG | WEIGHT: 119.8 LBS | TEMPERATURE: 98.4 F | HEIGHT: 63 IN | OXYGEN SATURATION: 95 % | SYSTOLIC BLOOD PRESSURE: 102 MMHG

## 2020-02-18 DIAGNOSIS — F33.9 EPISODE OF RECURRENT MAJOR DEPRESSIVE DISORDER, UNSPECIFIED DEPRESSION EPISODE SEVERITY (HCC): ICD-10-CM

## 2020-02-18 DIAGNOSIS — R06.83 SNORING: ICD-10-CM

## 2020-02-18 PROCEDURE — 99213 OFFICE O/P EST LOW 20 MIN: CPT | Performed by: FAMILY MEDICINE

## 2020-02-18 ASSESSMENT — PATIENT HEALTH QUESTIONNAIRE - PHQ9: CLINICAL INTERPRETATION OF PHQ2 SCORE: 0

## 2020-02-18 NOTE — PROGRESS NOTES
Subjective:     Chief Complaint   Patient presents with   • Follow-Up       HPI:   Amanda presents today to discuss the following.    Depression  This is a chronic problem.  The patient has a history of depression since age 17.  When I met with her she was having an exacerbation and I started her on Cymbalta which has been titrated to 30 mg twice daily.  She returns today for follow-up reporting complete resolution of her depressive symptoms and is doing a lot better on Cymbalta.  Denies any suicidality/homicidality.    Snoring  This is a chronic condition.  Symptom onset: The patient has been complaining of worsening snoring over the past 6 months.  She has been experiencing excessive fatigue during the day.  She is also waking up up to 10 times a night with a choking sensation and gasping for air.  Current symptoms: Overall she feels like the symptoms are slightly getting better.  Modifying factors: I had sent a referral to sleep medicine for further evaluation.  She still has not followed up with them.  Contact information is been requested today.  Treatments tried: Has not tried anything.  Associated symptoms: Endorses fatigue.      Past Medical History:   Diagnosis Date   • Depression    • Syringohydromyelia (HCC)        Social History     Tobacco Use   • Smoking status: Never Smoker   • Smokeless tobacco: Never Used   Substance Use Topics   • Alcohol use: Not Currently   • Drug use: Yes     Frequency: 7.0 times per week     Types: Marijuana, Inhaled       Current Outpatient Medications Ordered in Epic   Medication Sig Dispense Refill   • DULoxetine (CYMBALTA) 30 MG Cap DR Particles Take 1 Cap by mouth 2 times a day. 60 Cap 3     No current Epic-ordered facility-administered medications on file.        Allergies:  Pcn [penicillins]    Health Maintenance: Completed    ROS:  Gen: no fevers/chills, no changes in weight  Eyes: no changes in vision  ENT: no sore throat, no hearing loss, no bloody nose  Pulm: no sob,  "no cough  CV: no chest pain, no palpitations      Objective:     Exam:  /60 (BP Location: Left arm, Patient Position: Sitting, BP Cuff Size: Adult)   Pulse 68   Temp 36.9 °C (98.4 °F) (Temporal)   Resp 16   Ht 1.6 m (5' 3\")   Wt 54.3 kg (119 lb 12.8 oz)   SpO2 95%   BMI 21.22 kg/m²  Body mass index is 21.22 kg/m².    Constitutional: Alert, no distress, well-groomed.  Skin: Warm, dry, good turgor, no rashes in visible areas.  Eye: Equal, round and reactive, conjunctiva clear, lids normal.  ENMT: Lips without lesions, good dentition, moist mucous membranes.  Neck: Trachea midline, no masses, no thyromegaly.  Respiratory: Unlabored respiratory effort, no cough.  MSK: Normal gait, moves all extremities.  Neuro: Grossly non-focal.   Psych: Alert and oriented x3, normal affect and mood.      Assessment & Plan:     29 y.o. female with the following -     1. Episode of recurrent major depressive disorder, unspecified depression episode severity (HCC)  There is a chronic, stable condition.  The patient has a history of major depressive disorder currently well managed and controlled with Cymbalta 60 mg daily.  Overall she feels a lot better and more cheerful.  Cymbalta is working really well for her.  Denies any suicidality/homicidality.  -Continue current regimen    2. Snoring  This is a chronic, stable condition.  The patient has a history of worsening snoring over the past 6 months but lately it has been stable.  She still has frequent episodes of awakening in the middle the night with choking and gasping for air.  I suspect obstructive sleep apnea.  I recommend she follows up with sleep medicine.  Referral information given to her today.      Return in about 6 months (around 8/18/2020) for Fu depression .    Please note that this dictation was created using voice recognition software. I have made every reasonable attempt to correct obvious errors, but I expect that there are errors of grammar and possibly " content that I did not discover before finalizing the note.

## 2020-02-18 NOTE — ASSESSMENT & PLAN NOTE
This is a chronic condition.  Symptom onset: The patient has been complaining of worsening snoring over the past 6 months.  She has been experiencing excessive fatigue during the day.  She is also waking up up to 10 times a night with a choking sensation and gasping for air.  Current symptoms: Overall she feels like the symptoms are slightly getting better.  Modifying factors: I had sent a referral to sleep medicine for further evaluation.  She still has not followed up with them.  Contact information is been requested today.  Treatments tried: Has not tried anything.  Associated symptoms: Endorses fatigue.

## 2020-02-18 NOTE — ASSESSMENT & PLAN NOTE
This is a chronic problem.  The patient has a history of depression since age 17.  When I met with her she was having an exacerbation and I started her on Cymbalta which has been titrated to 30 mg twice daily.  She returns today for follow-up reporting complete resolution of her depressive symptoms and is doing a lot better on Cymbalta.  Denies any suicidality/homicidality.

## 2020-03-07 DIAGNOSIS — G95.0 SYRINGOHYDROMYELIA (HCC): ICD-10-CM

## 2020-03-07 DIAGNOSIS — F33.9 EPISODE OF RECURRENT MAJOR DEPRESSIVE DISORDER, UNSPECIFIED DEPRESSION EPISODE SEVERITY (HCC): ICD-10-CM

## 2020-03-09 RX ORDER — DULOXETIN HYDROCHLORIDE 30 MG/1
30 CAPSULE, DELAYED RELEASE ORAL 2 TIMES DAILY
Qty: 60 CAP | Refills: 0 | Status: SHIPPED | OUTPATIENT
Start: 2020-03-09 | End: 2020-03-13

## 2020-03-13 DIAGNOSIS — G95.0 SYRINGOHYDROMYELIA (HCC): ICD-10-CM

## 2020-03-13 DIAGNOSIS — F33.9 EPISODE OF RECURRENT MAJOR DEPRESSIVE DISORDER, UNSPECIFIED DEPRESSION EPISODE SEVERITY (HCC): ICD-10-CM

## 2020-03-13 RX ORDER — DULOXETIN HYDROCHLORIDE 30 MG/1
30 CAPSULE, DELAYED RELEASE ORAL 2 TIMES DAILY
Qty: 180 CAP | Refills: 0 | Status: SHIPPED | OUTPATIENT
Start: 2020-03-13 | End: 2020-07-06

## 2020-07-05 DIAGNOSIS — F33.9 EPISODE OF RECURRENT MAJOR DEPRESSIVE DISORDER, UNSPECIFIED DEPRESSION EPISODE SEVERITY (HCC): ICD-10-CM

## 2020-07-05 DIAGNOSIS — G95.0 SYRINGOHYDROMYELIA (HCC): ICD-10-CM

## 2020-07-06 RX ORDER — DULOXETIN HYDROCHLORIDE 30 MG/1
CAPSULE, DELAYED RELEASE ORAL
Qty: 180 CAP | Refills: 0 | Status: SHIPPED | OUTPATIENT
Start: 2020-07-06 | End: 2020-10-12

## 2020-10-10 DIAGNOSIS — F33.9 EPISODE OF RECURRENT MAJOR DEPRESSIVE DISORDER, UNSPECIFIED DEPRESSION EPISODE SEVERITY (HCC): ICD-10-CM

## 2020-10-10 DIAGNOSIS — G95.0 SYRINGOHYDROMYELIA (HCC): ICD-10-CM

## 2020-10-12 RX ORDER — DULOXETIN HYDROCHLORIDE 30 MG/1
CAPSULE, DELAYED RELEASE ORAL
Qty: 180 CAP | Refills: 3 | Status: SHIPPED | OUTPATIENT
Start: 2020-10-12 | End: 2021-10-22

## 2021-04-13 ENCOUNTER — IMMUNIZATION (OUTPATIENT)
Dept: FAMILY PLANNING/WOMEN'S HEALTH CLINIC | Facility: IMMUNIZATION CENTER | Age: 31
End: 2021-04-13
Payer: COMMERCIAL

## 2021-04-13 DIAGNOSIS — Z23 ENCOUNTER FOR VACCINATION: Primary | ICD-10-CM

## 2021-04-13 PROCEDURE — 91300 PFIZER SARS-COV-2 VACCINE: CPT

## 2021-04-13 PROCEDURE — 0001A PFIZER SARS-COV-2 VACCINE: CPT

## 2021-05-06 ENCOUNTER — IMMUNIZATION (OUTPATIENT)
Dept: FAMILY PLANNING/WOMEN'S HEALTH CLINIC | Facility: IMMUNIZATION CENTER | Age: 31
End: 2021-05-06
Payer: COMMERCIAL

## 2021-05-06 DIAGNOSIS — Z23 ENCOUNTER FOR VACCINATION: Primary | ICD-10-CM

## 2021-05-06 PROCEDURE — 0002A PFIZER SARS-COV-2 VACCINE: CPT | Performed by: INTERNAL MEDICINE

## 2021-05-06 PROCEDURE — 91300 PFIZER SARS-COV-2 VACCINE: CPT | Performed by: INTERNAL MEDICINE

## 2022-05-18 ENCOUNTER — NON-PROVIDER VISIT (OUTPATIENT)
Dept: HEMATOLOGY ONCOLOGY | Facility: MEDICAL CENTER | Age: 32
End: 2022-05-18
Payer: COMMERCIAL

## 2022-05-18 DIAGNOSIS — G95.0 SYRINGOHYDROMYELIA (HCC): ICD-10-CM

## 2022-05-18 PROCEDURE — 36415 COLL VENOUS BLD VENIPUNCTURE: CPT | Performed by: INTERNAL MEDICINE

## 2022-05-18 NOTE — PROGRESS NOTES
Amanda Wen is a 31 y.o. female here for a non-provider visit for: Lab Draws  on 5/18/2022 at 11:11 AM    Procedure Performed: Venipuncture attempted   Ambry saliva collected    Anatomical site: salivia specimen    Equipment used: 23g butterfly    Labs drawn: Ambry Salivia Specimen    Ordering Provider: Transfercar    Rolly By: Jasen Macedo [facility]

## 2022-07-13 ENCOUNTER — TELEPHONE (OUTPATIENT)
Dept: HEMATOLOGY ONCOLOGY | Facility: MEDICAL CENTER | Age: 32
End: 2022-07-13
Payer: COMMERCIAL